# Patient Record
Sex: MALE | Race: WHITE | URBAN - METROPOLITAN AREA
[De-identification: names, ages, dates, MRNs, and addresses within clinical notes are randomized per-mention and may not be internally consistent; named-entity substitution may affect disease eponyms.]

---

## 2018-03-27 ENCOUNTER — TRANSFERRED RECORDS (OUTPATIENT)
Dept: HEALTH INFORMATION MANAGEMENT | Facility: CLINIC | Age: 23
End: 2018-03-27

## 2018-04-01 ENCOUNTER — TRANSFERRED RECORDS (OUTPATIENT)
Dept: HEALTH INFORMATION MANAGEMENT | Facility: CLINIC | Age: 23
End: 2018-04-01

## 2018-04-03 ENCOUNTER — OFFICE VISIT (OUTPATIENT)
Dept: FAMILY MEDICINE | Facility: CLINIC | Age: 23
End: 2018-04-03
Payer: COMMERCIAL

## 2018-04-03 VITALS
TEMPERATURE: 97.7 F | SYSTOLIC BLOOD PRESSURE: 110 MMHG | WEIGHT: 217.9 LBS | HEIGHT: 72 IN | RESPIRATION RATE: 16 BRPM | DIASTOLIC BLOOD PRESSURE: 70 MMHG | BODY MASS INDEX: 29.51 KG/M2

## 2018-04-03 DIAGNOSIS — S83.411A SPRAIN OF MEDIAL COLLATERAL LIGAMENT OF RIGHT KNEE, INITIAL ENCOUNTER: Primary | ICD-10-CM

## 2018-04-03 PROCEDURE — 99202 OFFICE O/P NEW SF 15 MIN: CPT | Performed by: FAMILY MEDICINE

## 2018-04-03 NOTE — MR AVS SNAPSHOT
After Visit Summary   4/3/2018    Nathanael Sethi    MRN: [id number]           Patient Information     Date Of Birth          1995        Visit Information        Provider Department      4/3/2018 3:45 PM Corey Almanzar MD [facility]        Today's Diagnoses     Sprain of medial collateral ligament of right knee, initial encounter    -  1       Follow-ups after your visit        Additional Services     ORTHO  REFERRAL       [facility] is referring you to the Orthopedic  Services at [facility].       The  Representative will assist you in the coordination of your Orthopedic and Musculoskeletal Care as prescribed by your physician.    The  Representative will call you within 1 business day to help schedule your appointment, or you may contact the  Representative at:    All areas ~ [phone]     Type of Referral : Non Surgical       Timeframe requested: Within 2 weeks    Coverage of these services is subject to the terms and limitations of your health insurance plan.  Please call member services at your health plan with any benefit or coverage questions.      If X-rays, CT or MRI's have been performed, please contact the facility where they were done to arrange for , prior to your scheduled appointment.  Please bring this referral request to your appointment and present it to your specialist.                  Future tests that were ordered for you today     Open Future Orders        Priority Expected Expires Ordered    MR Knee Right w/o Contrast Routine  4/3/2019 4/3/2018            Who to contact     If you have questions or need follow up information about today's clinic visit or your schedule please contact [facility] directly at [phone].  Normal or non-critical lab and imaging results will be communicated to you by MyChart, letter or phone within 4  "business days after the clinic has received the results. If you do not hear from us within 7 days, please contact the clinic through [employer] or phone. If you have a critical or abnormal lab result, we will notify you by phone as soon as possible.  Submit refill requests through [employer] or call your pharmacy and they will forward the refill request to us. Please allow 3 business days for your refill to be completed.          Additional Information About Your Visit        [employer] Information     [employer] lets you send messages to your doctor, view your test results, renew your prescriptions, schedule appointments and more. To sign up, go to [email or web address] . Click on \"Log in\" on the left side of the screen, which will take you to the Welcome page. Then click on \"Sign up Now\" on the right side of the page.     You will be asked to enter the access code listed below, as well as some personal information. Please follow the directions to create your username and password.     Your access code is: [email or web address]  Expires: 2018  4:11 PM     Your access code will  in 90 days. If you need help or a new code, please call your [place] clinic or [phone].        Care EveryWhere ID     This is your Care EveryWhere ID. This could be used by other organizations to access your [place] medical records  [id number]        Your Vitals Were     Temperature Respirations Height BMI (Body Mass Index)          97.7  F (36.5  C) (Oral) 16 5' 11.75\" (1.822 m) 29.76 kg/m2         Blood Pressure from Last 3 Encounters:   18 110/70    Weight from Last 3 Encounters:   18 217 lb 14.4 oz (98.8 kg)              We Performed the Following     ORTHO  REFERRAL        Primary Care Provider Office Phone # Fax #    Corey Almanzar [phone] [address] [facility] [address]        Equal Access to Services     FRANCE DYE AH: Hadii mary azaro Soomaali, waaxda luqadaha, qaybta " owen drewfred mondragon ah. So [facility] [phone].    ATENCIÓN: Si habla humberto, tiene a high disposición servicios gratuitos de asistencia lingüística. Llame al [phone].    We comply with applicable federal civil rights laws and [place] laws. We do not discriminate on the basis of race, color, national origin, age, disability, sex, sexual orientation, or gender identity.            Thank you!     Thank you for choosing [facility]  for your care. Our goal is always to provide you with excellent care. Hearing back from our patients is one way we can continue to improve our services. Please take a few minutes to complete the written survey that you may receive in the mail after your visit with us. Thank you!             Your Updated Medication List - Protect others around you: Learn how to safely use, store and throw away your medicines at www.disposemymeds.org.      Notice  As of 4/3/2018 11:59 PM    You have not been prescribed any medications.

## 2018-04-03 NOTE — PROGRESS NOTES
"  SUBJECTIVE:   Nathanael Sethi is a 22 year old male who presents to clinic today for the following health issues:      Joint Pain    Onset: Saturday , wrestling (unusual) seen in , told he yung a \"torn MCL\"    Description:   Location: right knee   Character: Sharp, Stabbing and Burning    Intensity: moderate, severe    Progression of Symptoms: same     Accompanying Signs & Symptoms:  Other symptoms: swelling    History:   Previous similar pain: no       Precipitating factors:   Trauma or overuse: YES- wrestling     Alleviating factors:  Improved by: heat, ice and Ibuprofen    Therapies Tried and outcome: heat, ice and IBU       Problem list and histories reviewed & adjusted, as indicated.  Additional history: as documented        Reviewed and updated as needed this visit by clinical staff  Tobacco  Allergies  Meds  Soc Hx       No past medical history on file.    No past surgical history on file.    No family history on file.    Social History   Substance Use Topics     Smoking status: Never Smoker     Smokeless tobacco: Never Used     Alcohol use Yes       Reviewed and updated as needed this visit by Provider         ROS:  Can walk. No bruise edema    This document serves as a record of the services and decisions personally performed and made by Corey Almanzar MD. It was created on his behalf by Yisel Polo, a trained medical scribe.  The creation of this document is based on the scribe's personal observations and the provider's statements to the medical scribe.  Yisel Polo, April 3, 2018 4:05 PM    OBJECTIVE:     /70 (BP Location: Right arm, Patient Position: Chair, Cuff Size: Adult Large)  Temp 97.7  F (36.5  C) (Oral)  Resp 16  Ht 5' 11.75\" (1.822 m)  Wt 217 lb 14.4 oz (98.8 kg)  BMI 29.76 kg/m2  Body mass index is 29.76 kg/(m^2).    Affect worried  Right knee w/o effusion. Varus Valgus both painful, joint line non tender, neg ant drawer      ASSESSMENT/PLAN:   ASSESSMENT / " PLAN:  (S83.411A) Sprain of medial collateral ligament of right knee, initial encounter  (primary encounter diagnosis)  Comment: possible. Pt quite concerned  Plan: ORTHO  REFERRAL, MR Knee Right w/o         Contrast 1st          The information in this document, created by the medical scribe for me, accurately reflects the services I personally performed and the decisions made by me. I have reviewed and approved this document for accuracy prior to leaving the patient care area.  Corey Almanzar MD April 3, 2018 4:05 PM    Corey Almanzar MD  [facility]

## 2018-04-03 NOTE — LETTER
[facility]  [address] [address] [address]  [phone]          April 3, 2018    Nathanael Sethi                                                                                                                     [address] [facility] [address]            Re Millie Huffmanrick has an injury and can work from home, but should not commute to work        Sincerely,         Corey Almanzar MD

## 2018-04-05 ENCOUNTER — HOSPITAL ENCOUNTER (OUTPATIENT)
Dept: MRI IMAGING | Facility: CLINIC | Age: 23
Discharge: HOME | End: 2018-04-05
Attending: FAMILY MEDICINE | Admitting: FAMILY MEDICINE
Payer: COMMERCIAL

## 2018-04-05 DIAGNOSIS — S83.411A SPRAIN OF MEDIAL COLLATERAL LIGAMENT OF RIGHT KNEE, INITIAL ENCOUNTER: ICD-10-CM

## 2018-04-05 PROCEDURE — 73721 MRI JNT OF LWR EXTRE W/O DYE: CPT | Mod: RT

## 2018-04-05 NOTE — LETTER
April 6, 2018      Nathanael Sethi  [address] [address]        Dear ,    We are writing to inform you of your test results.    Just the medial collateral!     Resulted Orders   MR Knee Right w/o Contrast    Narrative    MR KNEE RIGHT WITHOUT CONTRAST   4/5/2018 6:36 PM    HISTORY:  Sprain of medial collateral ligament of right knee, initial  encounter.    TECHNIQUE:  Sagittal proton density and T2, coronal T1, and coronal  and transverse fat suppressed T2 weighted images.    COMPARISON: None.    FINDINGS:   Medial Meniscus: Mild motion artifact on sagittal images. No evidence  of meniscus tear.       Lateral Meniscus: Mild motion artifact on sagittal images. No evidence  of meniscus tear.       Anterior Cruciate Ligament: Intact.     Posterior Cruciate Ligament: Intact.     Medial Collateral Ligament: Mild increased signal surrounding the  proximal medial collateral ligament suspicious for MCL grade 1 sprain.  No evidence of disruption.    Lateral Collateral Ligament Complex, Popliteus Tendon: The fibular  collateral ligament, biceps femoris tendon, popliteal tendon, and  iliotibial band are intact.    Osseous Structures and Cartilaginous Surfaces:  Articular cartilage  surfaces in the medial, lateral, and patellofemoral compartments  appear within normal limits. Marrow signal is within normal limits.    Extensor Mechanism: The quadriceps and infrapatellar tendons are  intact. The medial and lateral patellar retinacula appear  unremarkable.    Joint Space: There is a physiological amount of fluid in the joint  space.  No definite articular bodies are demonstrated.    Additional Findings:  No semimembranosus-tibial collateral ligament or  pes anserine bursitis. No Baker's cyst.      Impression    IMPRESSION:    1. Probable mild grade 1 sprain proximal medial collateral ligament.  2. Otherwise unremarkable.    LANDON CHRIS MD       If you have any questions or concerns, please  call the clinic at the number listed above.       Sincerely,        Corey Almanzar MD/lf

## 2020-08-28 ENCOUNTER — RECORDS - HEALTHEAST (OUTPATIENT)
Dept: LAB | Facility: CLINIC | Age: 25
End: 2020-08-28

## 2020-08-31 LAB — HIV 1+2 AB+HIV1 P24 AG SERPL QL IA: NEGATIVE

## 2020-09-01 LAB
25(OH)D3 SERPL-MCNC: 29.8 NG/ML (ref 30–80)
T PALLIDUM AB SER QL: NEGATIVE

## 2020-09-02 LAB
HSV1 IGG SERPL QL IA: POSITIVE
HSV2 IGG SERPL QL IA: NEGATIVE

## 2021-05-27 ENCOUNTER — RECORDS - HEALTHEAST (OUTPATIENT)
Dept: ADMINISTRATIVE | Facility: CLINIC | Age: 26
End: 2021-05-27